# Patient Record
Sex: MALE | Race: OTHER | Employment: STUDENT | URBAN - METROPOLITAN AREA
[De-identification: names, ages, dates, MRNs, and addresses within clinical notes are randomized per-mention and may not be internally consistent; named-entity substitution may affect disease eponyms.]

---
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
## 2021-11-04 ENCOUNTER — HOSPITAL ENCOUNTER (OUTPATIENT)
Age: 21
Discharge: HOME | End: 2021-11-04
Payer: COMMERCIAL

## 2021-11-04 VITALS
SYSTOLIC BLOOD PRESSURE: 118 MMHG | BODY MASS INDEX: 32.89 KG/M2 | HEART RATE: 97 BPM | OXYGEN SATURATION: 99 % | DIASTOLIC BLOOD PRESSURE: 70 MMHG | TEMPERATURE: 99 F | RESPIRATION RATE: 18 BRPM | HEIGHT: 68 IN | WEIGHT: 217 LBS

## 2021-11-04 DIAGNOSIS — J06.9 VIRAL URI: ICD-10-CM

## 2021-11-04 DIAGNOSIS — Z20.822 ENCOUNTER FOR LABORATORY TESTING FOR COVID-19 VIRUS: Primary | ICD-10-CM

## 2021-11-04 PROCEDURE — 99203 OFFICE O/P NEW LOW 30 MIN: CPT | Performed by: NURSE PRACTITIONER

## 2021-11-04 PROCEDURE — U0002 COVID-19 LAB TEST NON-CDC: HCPCS | Performed by: NURSE PRACTITIONER

## 2021-11-04 NOTE — ED INITIAL ASSESSMENT (HPI)
Pt with c/o allergy symptoms that started on Tuesday. Pt c/o sneezing and congestion. Pt with c/o sore throat that started yesterday.   Pt c/o fatigue

## 2021-11-04 NOTE — ED PROVIDER NOTES
Patient Seen in: [facility]      History   Patient presents with:  Cold: Entered by patient    Stated Complaint: Cold    Subjective:   57-year-old male presents today with URI symptoms and sore throat that started 2 days ago.  Denies any nausea normal.      Nose: Mucosal edema, congestion and rhinorrhea present. Mouth/Throat:      Pharynx: Uvula midline. Posterior oropharyngeal erythema present.    Eyes:      Conjunctiva/sclera: Conjunctivae normal.      Pupils: Pupils are equal, round, and r

## (undated) NOTE — ED AVS SNAPSHOT
THE [facility] in [facility] [address] [id number]    Phone:  [phone]    Fax:  [phone]           Mr. Durham    MRN: [id number]    Department:  THE [facility] in [facility]   Date of Visit:  6/14/2017           D the next 10-14 days to consider repeat XR at that time       Discharge References/Attachments     HEAD TILT / UPPER TRAPEZIUS STRETCH (FLEXIBILITY) (ENGLISH)      Disclosure     Insurance plans vary and the physician(s) referred by the [facility]mediate [facility] may n IF THERE IS ANY CHANGE OR WORSENING OF YOUR CONDITION, CALL YOUR PRIMARY CARE PHYSICIAN AT ONCE OR GO TO THE EMERGENCY DEPARTMENT.     If you have been prescribed any medication(s), please fill your prescription right away and begin taking the medication(s) [facility] [address] to help you get signed up for insurance coverage. [facility] [address] is a Federal [employer]igator program that can help with your Affordable Care Act coverage, as well as all types of Medicaid plans.   To get signed up and covere